# Patient Record
Sex: FEMALE | Race: WHITE
[De-identification: names, ages, dates, MRNs, and addresses within clinical notes are randomized per-mention and may not be internally consistent; named-entity substitution may affect disease eponyms.]

---

## 2019-01-11 ENCOUNTER — HOSPITAL ENCOUNTER (EMERGENCY)
Dept: HOSPITAL 74 - FER | Age: 34
Discharge: HOME | End: 2019-01-11
Payer: COMMERCIAL

## 2019-01-11 VITALS — SYSTOLIC BLOOD PRESSURE: 135 MMHG | HEART RATE: 77 BPM | DIASTOLIC BLOOD PRESSURE: 80 MMHG | TEMPERATURE: 98.6 F

## 2019-01-11 VITALS — BODY MASS INDEX: 22.3 KG/M2

## 2019-01-11 DIAGNOSIS — L50.9: Primary | ICD-10-CM

## 2019-01-11 NOTE — PDOC
History of Present Illness





- General


Chief Complaint: Hives


Stated Complaint: HIVES


Time Seen by Provider: 01/11/19 00:42


History Source: Patient


Exam Limitations: No Limitations





- History of Present Illness


Initial Comments: 





01/11/19 00:45


This is a 33-year-old female who comes in complaining of hives. Patient said she

's had them times one day. Patient doesn't know what she is ALLERGIC to but 

denies any specific new lotion screams ointments or foods. Patient denies any 

shortness of breath or difficulty swallowing.





Allergies: None


Past Medical History: none


Social history: Lives with family. No smoking. No alcohol. No illicit drugs.


Surgical history: None








General:  No fevers or chills, no weakness, no weight loss 


HEENT: No change in vision.  No sore throat,. No ear pain


CardioVascular: no chest discomfort. No shortness of breath


Respiratory:No cough, or wheezing. 


Gastrointestinal:  no nausea, vomiting, diarrhea or constipation,  No rectal 

bleeding


Genitourinary:  No dysuria, hematuria, or frequency


Musculoskeletal:  No joint or muscle pain or swelling


Neurologic: No headache, vertigo, dizziness or loss of consciousness


Psychiatric: nor depression 


Skin:+ Hives no easy bruising


Endocrine: no increased thirst or abnormal weight change


Allergic: no skin or latex allergy


All other systems reviewed and normal








GENERAL: The patient is awake, alert, and fully 


oriented, in no acute distress.


HEAD: Normal with no signs of trauma.


EYES: Pupils equal, round and reactive to light, extraocular movements intact, 

sclera anicteric, conjunctiva clear.


EXTREMITIES:atraumatic, Normal range of motion, no edema.


NEUROLOGICAL: Normal speech, normal gait.


PSYCH: Normal mood, normal affect.


SKIN: Warm, Dry, normal turgor, + high as 2 through arms and legs as well as 

neck. Face is spared





Past History





- Past Medical History


Allergies/Adverse Reactions: 


 Allergies











Allergy/AdvReac Type Severity Reaction Status Date / Time


 


No Known Drug Allergies Allergy   Verified 10/08/12 18:29











Home Medications: 


Ambulatory Orders





Norelgestromin/Ethin.estradiol [Xulane Patch] 1 each TD DAILY 01/11/19 


Spironolactone 100 mg PO DAILY 01/11/19 








Cardiac Disorders: Yes (MURMUR)


COPD: No


Other medical history: ACNE





- Suicide/Smoking/Psychosocial Hx


Smoking Status: No


Smoking History: Never smoked


Number of Cigarettes Smoked Daily: 0





*Physical Exam





- Vital Signs


 Last Vital Signs











Temp Pulse Resp BP Pulse Ox


 


 98.6 F   77   16   135/80   100 


 


 01/11/19 00:38  01/11/19 00:38  01/11/19 00:38  01/11/19 00:38  01/11/19 00:38














Moderate Sedation





- Procedure Monitoring


Vital Signs: 


Procedure Monitoring Vital Signs











Temperature  98.6 F   01/11/19 00:38


 


Pulse Rate  77   01/11/19 00:38


 


Respiratory Rate  16   01/11/19 00:38


 


Blood Pressure  135/80   01/11/19 00:38


 


O2 Sat by Pulse Oximetry (%)  100   01/11/19 00:38











*DC/Admit/Observation/Transfer


Diagnosis at time of Disposition: 


 Hives








- Discharge Dispostion


Disposition: HOME


Condition at time of disposition: Stable


Decision to Admit order: No





- Referrals


Referrals: 


Negar Steve MD [Primary Care Provider] - 





- Patient Instructions


Additional Instructions: 


Take the Medrol Dosepak as prescribed by the pack.





If needed U can also take Benadryl in addition to the Medrol Dosepak U can take 

one tablet every 4-6 hours if needed





Return to the emergency department immediately with ANY new, persistent or 

worsening symptoms.





Continue any medications as previously prescribed by your physician.





You should follow up with your primary doctor as soon as possible regarding 

today's emergency department visit.


.


Please make sure your doctor reviews the results of your emergency evaluation.





Thank you for coming to the   Emergency Department today for your care. It was 

a pleasure to see you today. Please note that your evaluation is INCOMPLETE 

until you  follow-up with your doctor. 





- Post Discharge Activity

## 2023-02-24 ENCOUNTER — NON-APPOINTMENT (OUTPATIENT)
Age: 38
End: 2023-02-24

## 2023-03-06 PROBLEM — Z00.00 ENCOUNTER FOR PREVENTIVE HEALTH EXAMINATION: Status: ACTIVE | Noted: 2023-03-06

## 2023-05-09 ENCOUNTER — APPOINTMENT (OUTPATIENT)
Dept: PLASTIC SURGERY | Facility: CLINIC | Age: 38
End: 2023-05-09
Payer: COMMERCIAL

## 2023-05-09 VITALS
BODY MASS INDEX: 22.36 KG/M2 | TEMPERATURE: 98 F | DIASTOLIC BLOOD PRESSURE: 79 MMHG | HEIGHT: 64 IN | OXYGEN SATURATION: 100 % | WEIGHT: 131 LBS | SYSTOLIC BLOOD PRESSURE: 116 MMHG | HEART RATE: 72 BPM

## 2023-05-09 PROCEDURE — 99203 OFFICE O/P NEW LOW 30 MIN: CPT

## 2023-05-09 NOTE — PHYSICAL EXAM
[Normocephalic] : normocephalic [Atraumatic] : atraumatic [EOMI] : extra ocular movement intact [Sclera nonicteric] : sclera nonicteric [Supple] : supple [No Supraclavicular Adenopathy] : no supraclavicular adenopathy [No Cervical Adenopathy] : no cervical adenopathy [No Thyromegaly] : no thyromegaly [Clear to Auscultation Bilat] : clear to auscultation bilaterally [Normal Sinus Rhythm] : normal sinus rhythm [Normal S1, S2] : normal S1 and S2 [No Gallops] : no gallops [No Rubs] : no pericardial rub [Examined in the supine and seated position] : examined in the supine and seated position [No dominant masses] : no dominant masses in right breast  [No dominant masses] : no dominant masses left breast [No Nipple Retraction] : no left nipple retraction [No Nipple Discharge] : no left nipple discharge [No Axillary Lymphadenopathy] : no left axillary lymphadenopathy [No Edema] : no edema [No Rashes] : no rashes [No Ulceration] : no ulceration [de-identified] : 9:30 (N2cm) subcutaneous palpable mass 0.2 x 0.2 cm c/w probable epidermal inclusion cyst, no evidence of infection. 9:00 (N1cm) 0.2 x 0.2 cm subcutaneous thickening at site of prior nipple ring. [de-identified] : 3:00 (N1cm) 0.2 x 0.2 cm subcutaneous thickening at site of prior nipple ring.

## 2023-05-09 NOTE — HISTORY OF PRESENT ILLNESS
[FreeTextEntry1] : Patient is a 37 year old female here today for the evaluation of right breast pain.\par There is no family history of breast or ovarian cancer.\par She has a history of a right breast abscess in 2018\par 12/6/2021 s/p right 3:00 RA core biopsy with wing clip, performed by a breast surgeon, Dr. Yovana Priest. Pathology benign breast tissue with florid sclerosing adenosis and dense nodular stromal fibrosis.\par 7/18/2022 Bilateral ultrasound: Ultrasound examination of the bilateral breasts was performed  utilizing real-time scanning. In the left breast, there are simple cysts measuring up to 0.7 cm.  In the retroareolar region of the right breast at the 9  o'clock axis, there is a previously biopsied hypoechoic solid mass measuring 0.4 cm, unchanged from the prior ultrasound examination dated 12/6/2021. The  stability of the findings is consistent with a benign etiology.  Ultrasound examination of the bilateral axillae reveals no significant sonographic abnormalities. BI-RADS 2\par She presented to Urgent care in 2/2023 for the evaluation of right breast pain.\par She underwent a right breast ultrasound\par 2/2/2023 Right ultrasound ([facility]): A 0.4 x 0.3 x 0.3 cm previously biopsied and benign circumscribed hypoechoic mass in the 9:00 retroareolar right breast is without significant change from 7/18/2022 and measured 0.6 x 0.4 x 0.6 cm on prebiopsy images of 12/6/2021. No axillary lymphadenopathy. Recommend further management of breast of breast pain as clinically warranted. A bilateral diagnostic mammogram could be considered. BI-RADS 2.\par She came alone\par History of bilateral nipple piercing with right nipple infection in her 20's\par Since 2018 she has had 3 episodes of right breast 9:00 infections with warmth and redness lateral breast resolved after spontaneous drainage and antibiotics, incision and drainage not performed.\par At the end of 1/2023 she noted another inflamed lump closer to the nipple right lateral breast. She showed me a picture of right lateral nipple. She went to the ER and was given antibiotics and it slightly drained pus and then resolved.

## 2023-05-09 NOTE — REVIEW OF SYSTEMS
[Negative] : Heme/Lymph [FreeTextEntry5] : Chest pain, heart murmur, problem with the heart rhythm  [FreeTextEntry7] : Transition to a carnivore diet

## 2023-05-09 NOTE — ASSESSMENT
[FreeTextEntry1] : Right epidermal inclusion cyst\par Clinical breast exam benign \par \par 1. Annual bilateral mammogram/bilateral breast ultrasound at age 42\par 2. Follow up office visit as needed\par 3. Advised monthly self breast examinations and advised her to contact me if she has any concerns. \par 4. She inquired about genetic testing: [facility] genetic testing booklet discussed with her, blood drawn today\par \par Patient seen and examined with my PA Gardenia Conway present

## 2023-05-09 NOTE — CONSULT LETTER
[Dear  ___] : Dear  [unfilled], [Courtesy Letter:] : I had the pleasure of seeing your patient, [unfilled], in my office today. [Please see my note below.] : Please see my note below. [Sincerely,] : Sincerely, [DrEhsan  ___] : Dr. LUNA [DrEhsan ___] : Dr. LUNA [FreeTextEntry3] : Susan M. Palleschi, MD, FACS\par Division of Breast Surgery\par Director, Breast Surgery\par [facility]\par [address] [place] [address]\par [address]\[place] [address]\par (Phone) [phone]\par (Fax) [phone]

## 2023-06-02 ENCOUNTER — NON-APPOINTMENT (OUTPATIENT)
Age: 38
End: 2023-06-02

## 2023-07-31 ENCOUNTER — HOSPITAL ENCOUNTER (EMERGENCY)
Dept: HOSPITAL 74 - FER | Age: 38
Discharge: HOME | End: 2023-07-31
Payer: COMMERCIAL

## 2023-07-31 VITALS
SYSTOLIC BLOOD PRESSURE: 141 MMHG | HEART RATE: 73 BPM | TEMPERATURE: 98.5 F | DIASTOLIC BLOOD PRESSURE: 84 MMHG | RESPIRATION RATE: 18 BRPM

## 2023-07-31 VITALS — BODY MASS INDEX: 22.3 KG/M2

## 2023-07-31 DIAGNOSIS — M54.6: Primary | ICD-10-CM

## 2023-07-31 LAB
ALBUMIN SERPL-MCNC: 4.3 G/DL (ref 3.4–5)
ALP SERPL-CCNC: 51 U/L (ref 45–117)
ALT SERPL-CCNC: 18.6 U/L (ref 7–52)
ANION GAP SERPL CALC-SCNC: 10 MMOL/L (ref 8–16)
AST SERPL-CCNC: 15.3 U/L (ref 15–37)
BILIRUB SERPL-MCNC: 0.3 MG/DL (ref 0.2–1)
BUN SERPL-MCNC: 10.9 MG/DL (ref 7–18)
CALCIUM SERPL-MCNC: 9.1 MG/DL (ref 8.5–10.1)
CHLORIDE SERPL-SCNC: 104 MMOL/L (ref 98–107)
CO2 SERPL-SCNC: 24 MMOL/L (ref 21–32)
CREAT SERPL-MCNC: 0.7 MG/DL (ref 0.6–1.3)
DEPRECATED RDW RBC AUTO: 14.5 % (ref 11.6–15.6)
GLUCOSE SERPL-MCNC: 92 MG/DL (ref 74–106)
HCT VFR BLD CALC: 38 % (ref 32.4–45.2)
HGB BLD-MCNC: 12.8 G/DL (ref 10.7–15.3)
MCH RBC QN AUTO: 28.7 PG (ref 25.7–33.7)
MCHC RBC AUTO-ENTMCNC: 33.8 G/DL (ref 32–36)
MCV RBC: 85.1 FL (ref 80–96)
PLATELET # BLD AUTO: 152.9 10^3/UL (ref 134–434)
PLATELET BLD QL SMEAR: ADEQUATE
PMV BLD: 9 FL (ref 7.5–11.1)
POTASSIUM SERPLBLD-SCNC: 3.6 MMOL/L (ref 3.5–5.1)
PROT SERPL-MCNC: 6.6 G/DL (ref 6.4–8.2)
RBC # BLD AUTO: 4.47 10^6/UL (ref 3.6–5.2)
SODIUM SERPL-SCNC: 138 MMOL/L (ref 136–145)
WBC # BLD AUTO: 9.7 10^3/UL (ref 4–10.8)

## 2023-07-31 PROCEDURE — 3E0337Z INTRODUCTION OF ELECTROLYTIC AND WATER BALANCE SUBSTANCE INTO PERIPHERAL VEIN, PERCUTANEOUS APPROACH: ICD-10-PCS

## 2024-02-09 ENCOUNTER — APPOINTMENT (OUTPATIENT)
Dept: PLASTIC SURGERY | Facility: CLINIC | Age: 39
End: 2024-02-09
Payer: COMMERCIAL

## 2024-02-09 VITALS
BODY MASS INDEX: 22.36 KG/M2 | OXYGEN SATURATION: 99 % | SYSTOLIC BLOOD PRESSURE: 120 MMHG | WEIGHT: 131 LBS | HEART RATE: 83 BPM | DIASTOLIC BLOOD PRESSURE: 75 MMHG | HEIGHT: 64 IN | TEMPERATURE: 97.6 F

## 2024-02-09 PROCEDURE — 99213 OFFICE O/P EST LOW 20 MIN: CPT

## 2024-02-09 RX ORDER — CEFADROXIL 500 MG/1
500 CAPSULE ORAL TWICE DAILY
Qty: 20 | Refills: 1 | Status: ACTIVE | COMMUNITY
Start: 2024-02-09 | End: 1900-01-01

## 2024-02-09 NOTE — HISTORY OF PRESENT ILLNESS
[FreeTextEntry1] : The patient is a 38 year old female here today with concern regarding a recurrent infection of her right nipple.  There is no family history of breast or ovarian cancer. She has a history of a right breast abscess in 2018 12/6/2021 s/p right 3:00 RA core biopsy with wing clip, performed by a breast surgeon, Dr. Yovana Priest. Pathology benign breast tissue with florid sclerosing adenosis and dense nodular stromal fibrosis. 7/18/2022 Bilateral ultrasound: Ultrasound examination of the bilateral breasts was performed utilizing real-time scanning. In the left breast, there are simple cysts measuring up to 0.7 cm. In the retroareolar region of the right breast at the 9 o'clock axis, there is a previously biopsied hypoechoic solid mass measuring 0.4 cm, unchanged from the prior ultrasound examination dated 12/6/2021. The stability of the findings is consistent with a benign etiology. Ultrasound examination of the bilateral axillae reveals no significant sonographic abnormalities. BI-RADS 2 She presented to Urgent care in 2/2023 for the evaluation of right breast pain. She underwent a right breast ultrasound 2/2/2023 Right ultrasound ([facility]): A 0.4 x 0.3 x 0.3 cm previously biopsied and benign circumscribed hypoechoic mass in the 9:00 retroareolar right breast is without significant change from 7/18/2022 and measured 0.6 x 0.4 x 0.6 cm on prebiopsy images of 12/6/2021. No axillary lymphadenopathy. Recommend further management of breast of breast pain as clinically warranted. A bilateral diagnostic mammogram could be considered. BI-RADS 2. History of bilateral nipple piercing with right nipple infection in her 20's Since 2018 she has had 3 episodes of right breast 9:00 infections with warmth and redness lateral breast resolved after spontaneous drainage and antibiotics, incision and drainage not performed. At the end of 1/2023 she noted another inflamed lump closer to the nipple right lateral breast. She showed me a picture of right lateral nipple. She went to the ER and was given antibiotics and it slightly drained pus and then resolved.  5/9/2023- Office visit with Dr. Palleschi.  5/9/2023- [facility] Comprehensive Genetic Testing: tana Banuelos noted a pressure discomfort of the right breast  on 2/5/2024.  She then noted redness and warmth.  She denies any pain.  She denies any spontaneous drainage or fever.  She has not been placed on antibiotics.  She came alone.

## 2024-02-09 NOTE — CONSULT LETTER
[Dear  ___] : Dear  [unfilled], [Courtesy Letter:] : I had the pleasure of seeing your patient, [unfilled], in my office today. [Please see my note below.] : Please see my note below. [Sincerely,] : Sincerely, [DrEhsan  ___] : Dr. LUNA [DrEhsan ___] : Dr. LUNA [FreeTextEntry3] : Susan M. Palleschi, MD, FACS\par  Division of Breast Surgery\par  Director, Breast Surgery\par  [facility]\par  [address] [place] [address]\par  [address]\[place] [address]\par  (Phone) [phone]\par  (Fax) [phone]

## 2024-02-09 NOTE — REVIEW OF SYSTEMS
[Negative] : Heme/Lymph [FreeTextEntry5] : "Problems with heart rhythm" [de-identified] : "Problems with skin/skin cancer"  [FreeTextEntry1] : Colon cancer

## 2024-02-09 NOTE — PHYSICAL EXAM
[Normocephalic] : normocephalic [Atraumatic] : atraumatic [EOMI] : extra ocular movement intact [Sclera nonicteric] : sclera nonicteric [Supple] : supple [No Supraclavicular Adenopathy] : no supraclavicular adenopathy [No Cervical Adenopathy] : no cervical adenopathy [No Thyromegaly] : no thyromegaly [Clear to Auscultation Bilat] : clear to auscultation bilaterally [Normal Sinus Rhythm] : normal sinus rhythm [Normal S1, S2] : normal S1 and S2 [No Gallops] : no gallops [No Rubs] : no pericardial rub [Examined in the supine and seated position] : examined in the supine and seated position [No dominant masses] : no dominant masses in right breast  [No dominant masses] : no dominant masses left breast [No Nipple Retraction] : no left nipple retraction [No Nipple Discharge] : no left nipple discharge [No Axillary Lymphadenopathy] : no left axillary lymphadenopathy [No Edema] : no edema [No Rashes] : no rashes [No Ulceration] : no ulceration [de-identified] : Right 9:00 breast erythema with associated underlying palpable swelling measuring 1.5 x 1.5 cm, located 1.5 cm from the nipple.  The findings are consistent with infection and abscess.  Applying pressure to the nipple area resulted in expressible pus from the right 2:00 duct with resulting decreased size of the palpable swelling.

## 2024-02-09 NOTE — ASSESSMENT
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 She inquired about elective surgical excision of the epidermal inclusion cyst to decrease her future risk of recurrent infections.  1.  I will send in a prescription for Duricef 500 mg p.o. twice daily x 10 days. 2. Follow up office visit 2 weeks for re-examination. 3.  I discussed with her the potential option of undergoing elective surgical excision of any residual epidermal inclusion cyst and cleanout of the subareolar ducts to aim to lower her potential risk for recurrent infection.  I will discuss this with her further at the next office visit.  I discussed with her that this surgical procedure may preclude her from breast-feeding in the future.  She states that she does not intend to have children. 4. Annual bilateral mammogram/bilateral breast ultrasound at age 42 5. Advised monthly self breast examinations and advised her to contact me if she has any concerns.

## 2024-02-13 ENCOUNTER — NON-APPOINTMENT (OUTPATIENT)
Age: 39
End: 2024-02-13

## 2024-02-21 ENCOUNTER — APPOINTMENT (OUTPATIENT)
Dept: PLASTIC SURGERY | Facility: CLINIC | Age: 39
End: 2024-02-21
Payer: COMMERCIAL

## 2024-02-21 VITALS
OXYGEN SATURATION: 99 % | HEART RATE: 83 BPM | WEIGHT: 131 LBS | TEMPERATURE: 98 F | BODY MASS INDEX: 22.36 KG/M2 | SYSTOLIC BLOOD PRESSURE: 111 MMHG | DIASTOLIC BLOOD PRESSURE: 23 MMHG | HEIGHT: 64 IN

## 2024-02-21 DIAGNOSIS — N60.81 OTHER BENIGN MAMMARY DYSPLASIAS OF RIGHT BREAST: ICD-10-CM

## 2024-02-21 PROCEDURE — 99214 OFFICE O/P EST MOD 30 MIN: CPT

## 2024-02-21 NOTE — PHYSICAL EXAM
[de-identified] : Right 9:00 palpable mass (N1.5 cm) 3 x 3 mm at edge of areola border, consistent with inclusion cyst. No infection

## 2024-02-21 NOTE — REVIEW OF SYSTEMS
[Negative] : Heme/Lymph [FreeTextEntry5] : "Heart Murmur- minor, problems with heart rhythm- sometimes races randomly (COVID vaccine?)"

## 2024-02-21 NOTE — HISTORY OF PRESENT ILLNESS
[FreeTextEntry1] : The patient is a 38 year old female here today for a follow up for a right breast infection.  There is no family history of breast or ovarian cancer.  Her paternal grandmother had colon cancer diagnosed in her late 30s or early 40s. She has a history of a right breast abscess in 2018 12/6/2021 s/p right 3:00 RA core biopsy with wing clip, performed by a breast surgeon, Dr. Yovana Priest. Pathology benign breast tissue with florid sclerosing adenosis and dense nodular stromal fibrosis. 7/18/2022 Bilateral ultrasound: Ultrasound examination of the bilateral breasts was performed utilizing real-time scanning. In the left breast, there are simple cysts measuring up to 0.7 cm. In the retroareolar region of the right breast at the 9 o'clock axis, there is a previously biopsied hypoechoic solid mass measuring 0.4 cm, unchanged from the prior ultrasound examination dated 12/6/2021. The stability of the findings is consistent with a benign etiology. Ultrasound examination of the bilateral axillae reveals no significant sonographic abnormalities. BI-RADS 2 She presented to Urgent care in 2/2023 for the evaluation of right breast pain. She underwent a right breast ultrasound 2/2/2023 Right ultrasound ([facility]): A 0.4 x 0.3 x 0.3 cm previously biopsied and benign circumscribed hypoechoic mass in the 9:00 retroareolar right breast is without significant change from 7/18/2022 and measured 0.6 x 0.4 x 0.6 cm on prebiopsy images of 12/6/2021. No axillary lymphadenopathy. Recommend further management of breast of breast pain as clinically warranted. A bilateral diagnostic mammogram could be considered. BI-RADS 2. History of bilateral nipple piercing with right nipple infection in her 20's Since 2018 she has had 3 episodes of right breast 9:00 infections with warmth and redness lateral breast resolved after spontaneous drainage and antibiotics, incision and drainage not performed. At the end of 1/2023 she noted another inflamed lump closer to the nipple right lateral breast. She showed me a picture of right lateral nipple. She went to the ER and was given antibiotics and it slightly drained pus and then resolved.  5/9/2023- [facility] Genetic Testing: negative for breast.  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 She was seen in the office 2/9/2024 for a right breast abscess. She completed a 7 day course of Cefadroxil.  She states that the infection has resolved.  She does note a small palpable mass at the site of the prior infection.  She came alone.

## 2024-02-21 NOTE — CONSULT LETTER
[FreeTextEntry3] : Susan M. Palleschi, MD, FACS\par  Division of Breast Surgery\par  Director, Breast Surgery\par  [facility]\par  [address] [place] [address]\par  [address]\[place] [address]\par  (Phone) [phone]\par  (Fax) [phone]

## 2024-02-21 NOTE — ASSESSMENT
[FreeTextEntry1] : History of right epidermal inclusion cyst with recurrent infection and abscess. Infection resolved with course of antibiotics. She is interested in elective surgical excision of the epidermal inclusion cyst to decrease her future risk of recurrent infections.  1.  Annual bilateral mammogram/bilateral breast ultrasound at age 42 2. Follow up office visit postop 3.  I discussed with her the potential option of undergoing elective surgical excision of the residual epidermal inclusion cyst and cleanout of the subareolar ducts to aim to lower her potential risk for recurrent infection.  The procedure was discussed with her in detail, including but not limited to bleeding, infection, scar, change in breast appearance, risk of infection.  I discussed with her that this surgical procedure may preclude her from breast-feeding in the future.  She states that she does not intend to have children.  The breast biopsy booklet and postoperative instructions were reviewed and provided. 4. Advised monthly self breast examinations and advised her to contact me if she has any concerns.  5.  I will refer her to the [facility] cancer genetic program to further discuss her genetic mutation which puts her at increased risk for colon polyps.  She has not yet undergone a baseline colonoscopy.

## 2024-02-28 ENCOUNTER — NON-APPOINTMENT (OUTPATIENT)
Age: 39
End: 2024-02-28

## 2024-03-22 ENCOUNTER — OUTPATIENT (OUTPATIENT)
Dept: OUTPATIENT SERVICES | Facility: HOSPITAL | Age: 39
LOS: 1 days | Discharge: HOME | End: 2024-03-22

## 2024-03-22 DIAGNOSIS — Z15.09 GENETIC SUSCEPTIBILITY TO OTHER MALIGNANT NEOPLASM: ICD-10-CM

## 2024-03-25 ENCOUNTER — OUTPATIENT (OUTPATIENT)
Dept: OUTPATIENT SERVICES | Facility: HOSPITAL | Age: 39
LOS: 1 days | End: 2024-03-25
Payer: COMMERCIAL

## 2024-03-25 VITALS
SYSTOLIC BLOOD PRESSURE: 105 MMHG | DIASTOLIC BLOOD PRESSURE: 70 MMHG | RESPIRATION RATE: 16 BRPM | HEIGHT: 64 IN | HEART RATE: 85 BPM | OXYGEN SATURATION: 99 % | TEMPERATURE: 98 F | WEIGHT: 140.65 LBS

## 2024-03-25 DIAGNOSIS — D24.1 BENIGN NEOPLASM OF RIGHT BREAST: ICD-10-CM

## 2024-03-25 DIAGNOSIS — Z98.890 OTHER SPECIFIED POSTPROCEDURAL STATES: Chronic | ICD-10-CM

## 2024-03-25 DIAGNOSIS — Z01.818 ENCOUNTER FOR OTHER PREPROCEDURAL EXAMINATION: ICD-10-CM

## 2024-03-25 LAB
HCT VFR BLD CALC: 38.8 % — SIGNIFICANT CHANGE UP (ref 34.5–45)
HGB BLD-MCNC: 12.8 G/DL — SIGNIFICANT CHANGE UP (ref 11.5–15.5)
MCHC RBC-ENTMCNC: 27.9 PG — SIGNIFICANT CHANGE UP (ref 27–34)
MCHC RBC-ENTMCNC: 33 GM/DL — SIGNIFICANT CHANGE UP (ref 32–36)
MCV RBC AUTO: 84.5 FL — SIGNIFICANT CHANGE UP (ref 80–100)
NRBC # BLD: 0 /100 WBCS — SIGNIFICANT CHANGE UP (ref 0–0)
PLATELET # BLD AUTO: 208 K/UL — SIGNIFICANT CHANGE UP (ref 150–400)
RBC # BLD: 4.59 M/UL — SIGNIFICANT CHANGE UP (ref 3.8–5.2)
RBC # FLD: 12.6 % — SIGNIFICANT CHANGE UP (ref 10.3–14.5)
WBC # BLD: 8.36 K/UL — SIGNIFICANT CHANGE UP (ref 3.8–10.5)
WBC # FLD AUTO: 8.36 K/UL — SIGNIFICANT CHANGE UP (ref 3.8–10.5)

## 2024-03-25 PROCEDURE — 36415 COLL VENOUS BLD VENIPUNCTURE: CPT

## 2024-03-25 PROCEDURE — 85027 COMPLETE CBC AUTOMATED: CPT

## 2024-03-25 PROCEDURE — G0463: CPT

## 2024-03-25 NOTE — H&P PST ADULT - NEGATIVE BREAST SYMPTOMS
see HPI/no breast tenderness L/no breast tenderness R/no breast lump L/no breast lump R/no nipple discharge L/no nipple discharge R

## 2024-03-25 NOTE — H&P PST ADULT - HISTORY OF PRESENT ILLNESS
This is a 39 y/o female who presents to PST with pre-operative diagnosis of benign neoplasm of right breast.  H/o breast abbesses  that required I&D and antibiotics.  Mass noted on imaging in right breast.  Likely epidermal inclusion cyst. Mass to be removed to prevent recurrent infections.  Scheduled for 4/18/24.  Today denies any breast pain, nipple discharge, fever/chills.

## 2024-03-25 NOTE — H&P PST ADULT - ATTENDING COMMENTS
The patient is a 38 year old female who has a history of recurrent right breast infections, related to an epidermal inclusion cyst. She presents to undergo excision of the right breast inclusion cyst.

## 2024-03-27 ENCOUNTER — APPOINTMENT (OUTPATIENT)
Dept: HEMATOLOGY ONCOLOGY | Facility: CLINIC | Age: 39
End: 2024-03-27

## 2024-03-27 NOTE — DISCUSSION/SUMMARY
[FreeTextEntry1] : The visit was provided via telehealth using real-time 2-way audio visual technology. The patient, Lakisha Beauchamp, was located at work, in [facility], at the time of the visit. The Genetic Counselor, Luci Matthews, was located in [place], [place]. The patient and the Genetic Counselor both participated in the telehealth encounter. Consent for telehealth services was given on 3/27/2024 by the patient, Lakisha Beauchamp.    REASON FOR CONSULT  Lakisha Beauchamp is a 38-year-old female who was referred by Dr. Palleschi for cancer genetic counseling and discussion of her genetic test results.    RELEVANT MEDICAL HISTORY    FAMILY HISTORY:  Ms. Beauchamp is a healthy individual who has never had cancer. She has a family history of early onset colorectal cancer in her paternal grandmother, see below.    Of note, Ms. Beauchamp had genetic testing with [employer]'s Common Hereditary Cancers panel (47 genes) which was ordered by Dr. Palleschi's office. Ms. 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 This testing was reported on 2023   OTHER MEDICAL AND SURGICAL HISTORY:  History of right breast epidermal inclusion cyst with recurrent infection and abscess since 2018 + right nipple infection in her 20s. History of right breast abscess in . Recent right breast abscess in 2024 treated with cefadroxil. Sebaceous cyst of right breast. Plans to undergo elective surgical excision of right breast epidermal inclusion cyst and cleanout of the subareolar ducts in 2024. No previous surgeries.    PAST OB/GYN HISTORY:  Obstetrical History:   Age at Menarche: 14 Premenopausal Age at First Live Birth: N/A Oral Contraceptive Use: Yes, (less than 1 year)  Hormone Replacement Therapy: No   CANCER SCREENING HISTORY:    Breast: Annual b/l mammo and US recommended at age 42. Monthly breast exams advised. Last US of right breast on 2023 - benign circumscribed hypoechoic mass in right breast which was previously biopsied and stable. B/l diagnostic mammogram could be considered - BIRADS-2. Bilateral US 2022 - simple cysts seen in left breast, and hypoechoic solid mass in right breast which was previously biopsied and remains unchanged from US on 2021. Stable findings consistent with benign etiology.  Previous history of right breast core bx on 2021 - benign breast tissue with florid sclerosing adenosis and dense nodular stromal fibrosis. GYN: Last pelvic exam on 3/27/2024 reported right ovarian cyst, pending follow up. Follows with Dr. Puneet Mendez at [facility] in [place]. Follows up annually.   Colon: No colonoscopy.  Skin: Last FBSE in  reported normal. Follows up with dermatology as needed.      SOCIAL HISTORY:  -	Single  -	Tobacco-product use: No   FAMILY HISTORY:  Maternal ancestry was reported as [place] and paternal ancestry was reported as [place]. A detailed family history of cancer was ascertained. Relevant diagnoses are detailed below and in the scanned pedigree.     To Ms. Beauchamp''s knowledge, no one in the family has had germline testing for cancer susceptibility.      RESULTS INTERPRETATION AND ASSESSMENT:    We informed Ms. 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 We discussed that individuals with a single mutation in NTHL1, also known as NTHL1 carriers, are not believed to have any increased risk for cancer over the general population. Individuals with TWO bi-allelic mutations in the NTHL1 gene are known to have NTHL1-associated polyposis, an autosomal recessive condition characterized by the development of multiple adenomatous colorectal polyps, and increased risk for colorectal cancer. Ms. Beauchamp DOES NOT have NTHL1-associated polyposis as a single pathogenic variant in the NTHL1 gene is NOT sufficient to cause NTHL1-associated polyposis. Therefore, there are no medical management changes for NTHL1 carriers at this time.     We also discussed that, while the cause of the early onset colon cancer in her paternal grandmother remains unknown, this result, while reassuring, does not entirely rule out a hereditary cancer risk in the patient. It is possible, although unlikely, the patient has a mutation in one of the genes tested that is not detectable by this analysis, or has a mutation in a different gene, either known or unknown. It is also possible there is a hereditary cancer predisposition in the family, but the patient did not inherit it.    IMPLICATIONS FOR THE PATIENT:  Given Ms. Beauchamp's current reported family history of cancer, and her NTHL1 heterozygous carrier status, the following screening guidelines and risk-reducing recommendations were discussed:    BREAST:  - Ms. Beauchamp should continue with age-appropriate breast follow up as determined by her breast specialist.  COLON:  - Heterozygous carriers of a pathogenic NTLH1 variants are not believed to have any increased risk for colon cancer or polyposis over the general population. NTHL1 carrier status should not lead to a recommendation for increased or more frequent colonoscopies based on genetic status alone at this time.  - In the absence of a first degree relative with colon cancer, and/or any other indications or symptoms, we recommend age-appropriate colonoscopies for Ms. Beauchamp as recommended for the general population.   OTHER:  - In the absence of other indications, Ms. Beauchamp should practice age-appropriate cancer screening of other organ systems as recommended for the general population.    IMPLICATIONS FOR FAMILY MEMBERS:  It was discussed that the NTLH1 variant is inherited in an autosomal dominant pattern. We recommend the patient's first-degree relatives, specifically her siblings and parents, consider genetic counseling and genetic testing as there is a 50% chance they also have the same mutation. Testing her parents would determine which side of the family the NTH1 is being inherited from and thus identify whether maternal or paternal aunts/uncles/cousins are at risk of carrying the NTHL mutation.  Please note, individuals with a single pathogenic mutation in the NTLH1 gene DO NOT have NTHL1-associated polyposis however this knowledge on carrier status may be important in determining reproductive risk for NTLH1-associated polyposis. If both the individual and their reproductive partner are carriers of a pathogenic NTHL1 mutation, there is a 25% chance for each pregnancy to have a child affected with NTHL1-associated polyposis. Ms. Beauchamp was made aware that if any at-risk relatives wanted to pursue genetic testing any time in the future, we would be happy to see them and coordinate testing. If they are not local, they can locate a genetic counselor using the National Society of Genetic Counselors, Find a Genetic Counselor Tool (www.nsgc.org/findageneticcounselor).  In addition, we discussed that Ms. Beauchamp's paternal grandmother could consider genetic counseling with the option of genetic testing given her early onset colon cancer.  Ms. Beauchamp was also informed that her father and paternal aunts would be advised to undergo colonoscopies every 5 years (or more frequently depending on the findings) as per the U.S. Multi-Society Task Force on Colorectal Cancer guidelines.    REPRODUCTIVE IMPLICATIONS AND OPTIONS  The risk of passing on this mutation to a future generation is 50%. Since the genetic mutation is known, pre-implantation genetic diagnosis (PGD) is possible for individuals of reproductive age. Ms. Beauchamp is currently single and not planning any children however if this changes, we discussed the option of NTHL carrier testing for her reproductive partner.     RESOURCES & SUPPORT GROUPS    In addition, the oncology social workers at [facility] are available to assist with more referrals, if necessary.    PLAN:  1. See above note for recommended management.  2. We encouraged sharing these results with family members. They have a risk to have inherited the same mutation. Other family may benefit from genetic testing and should contact a certified genetic counselor specializing in cancer. Due to HIPAA and [place] laws, Genetics is unable to directly contact other family at risk, but we are available should family members wish to reach out to us.  3. Family support resources and referrals were provided.  4. Patient informed consult note(s) will be available through their [facility] patient portal.  5. Genetic knowledge changes rapidly. We encouraged re-contacting Cancer Genetics every 2-3 years for any changes in screening recommendations or sooner if there are significant changes in personal or family history.   For any additional questions please call Cancer Genetics at [phone].      Luci Matthews, MSc, [facility]  Genetic Counselor, Cancer Genetics      CC:   Dr. Palleschi

## 2024-04-17 ENCOUNTER — TRANSCRIPTION ENCOUNTER (OUTPATIENT)
Age: 39
End: 2024-04-17

## 2024-04-18 ENCOUNTER — RESULT REVIEW (OUTPATIENT)
Age: 39
End: 2024-04-18

## 2024-04-18 ENCOUNTER — OUTPATIENT (OUTPATIENT)
Dept: OUTPATIENT SERVICES | Facility: HOSPITAL | Age: 39
LOS: 1 days | End: 2024-04-18
Payer: COMMERCIAL

## 2024-04-18 ENCOUNTER — TRANSCRIPTION ENCOUNTER (OUTPATIENT)
Age: 39
End: 2024-04-18

## 2024-04-18 ENCOUNTER — APPOINTMENT (OUTPATIENT)
Dept: PLASTIC SURGERY | Facility: HOSPITAL | Age: 39
End: 2024-04-18
Payer: COMMERCIAL

## 2024-04-18 VITALS
DIASTOLIC BLOOD PRESSURE: 81 MMHG | OXYGEN SATURATION: 99 % | TEMPERATURE: 97 F | HEART RATE: 80 BPM | RESPIRATION RATE: 16 BRPM | SYSTOLIC BLOOD PRESSURE: 122 MMHG

## 2024-04-18 VITALS
SYSTOLIC BLOOD PRESSURE: 115 MMHG | TEMPERATURE: 97 F | OXYGEN SATURATION: 98 % | HEART RATE: 86 BPM | DIASTOLIC BLOOD PRESSURE: 76 MMHG | RESPIRATION RATE: 18 BRPM | HEIGHT: 64 IN | WEIGHT: 140.65 LBS

## 2024-04-18 DIAGNOSIS — Z98.890 OTHER SPECIFIED POSTPROCEDURAL STATES: Chronic | ICD-10-CM

## 2024-04-18 DIAGNOSIS — D24.1 BENIGN NEOPLASM OF RIGHT BREAST: ICD-10-CM

## 2024-04-18 PROCEDURE — 19120 REMOVAL OF BREAST LESION: CPT | Mod: RT

## 2024-04-18 PROCEDURE — 88307 TISSUE EXAM BY PATHOLOGIST: CPT | Mod: 26

## 2024-04-18 PROCEDURE — 88307 TISSUE EXAM BY PATHOLOGIST: CPT

## 2024-04-18 RX ORDER — ACETAMINOPHEN 500 MG
2 TABLET ORAL
Qty: 0 | Refills: 0 | DISCHARGE

## 2024-04-18 RX ORDER — OXYCODONE HYDROCHLORIDE 5 MG/1
5 TABLET ORAL ONCE
Refills: 0 | Status: DISCONTINUED | OUTPATIENT
Start: 2024-04-18 | End: 2024-04-18

## 2024-04-18 RX ORDER — HYDROMORPHONE HYDROCHLORIDE 2 MG/ML
0.5 INJECTION INTRAMUSCULAR; INTRAVENOUS; SUBCUTANEOUS ONCE
Refills: 0 | Status: DISCONTINUED | OUTPATIENT
Start: 2024-04-18 | End: 2024-04-18

## 2024-04-18 RX ORDER — SODIUM CHLORIDE 9 MG/ML
1000 INJECTION, SOLUTION INTRAVENOUS
Refills: 0 | Status: DISCONTINUED | OUTPATIENT
Start: 2024-04-18 | End: 2024-04-18

## 2024-04-18 RX ORDER — OXYCODONE HYDROCHLORIDE 5 MG/1
10 TABLET ORAL ONCE
Refills: 0 | Status: DISCONTINUED | OUTPATIENT
Start: 2024-04-18 | End: 2024-04-18

## 2024-04-18 RX ADMIN — SODIUM CHLORIDE 50 MILLILITER(S): 9 INJECTION, SOLUTION INTRAVENOUS at 10:55

## 2024-04-18 NOTE — ASU PREOP CHECKLIST - VIA
[email or web address]
[email or web address]

## 2024-04-18 NOTE — ASU PATIENT PROFILE, ADULT - FALL HARM RISK - UNIVERSAL INTERVENTIONS
Bed in lowest position, wheels locked, appropriate side rails in place/Call bell, personal items and telephone in reach/Instruct patient to call for assistance before getting out of bed or chair/Non-slip footwear when patient is out of bed/[place] to call system/Physically safe environment - no spills, clutter or unnecessary equipment/Purposeful Proactive Rounding/Room/bathroom lighting operational, light cord in reach

## 2024-04-18 NOTE — ASU DISCHARGE PLAN (ADULT/PEDIATRIC) - ASU DC SPECIAL INSTRUCTIONSFT
Susan M. Palleschi, MD, FACS  Specializing in Diseases and Surgery of the Breast  Director, Breast Surgery , [address]  [address]   [place] [address]  Tel: [phone]		Fax: [phone]    Breast Biopsy/Lumpectomy Discharge Instructions    1.	Follow-up Appointment- Please call the office ([phone]) to schedule your post-operative appointment which should be approximately 7-10 days after your surgery.     2.	Bruising/Bleeding/ Swelling – It is normal for there to be some bruising and swelling at the breast biopsy site. Some discomfort at the surgical site is also normal. If your symptoms seem excessive, or if you have any questions or concerns, please call the office.    3.	Supportive Bra- Please bring a sports/athlete bra with a front or back closure with you on the day of surgery. You will wear it home from the hospital. You should wear the supportive bra continuously for 48 hours after your procedure, including wearing it to sleep. Thereafter, you may regular bra. You may remove the bra to shower. The sports bra will provide support, decrease the amount of swelling at the biopsy site and make your recovery more comfortable.    4.	Wound Dressing – The incision(s) will be covered with a special type of surgery glue called Dermabond. It has a purplish hue and looks like plastic. It should stay on the skin until it flakes off naturally over the following 1-2 weeks. Please do NOT peel off the Dermabond. All of the stitches are “internal” and will dissolve naturally.     5.	Ice- You may apply ice to the surgical sites off and on as needed for symptomatic relief.     6.	Showering/Bathing- You may shower over the incision the very next day after surgery. Allow the water to run over the incision, but do NOT scrub the area. It is best not to sit in a bathtub or swimming pool for at least 1 week after surgery.     7.	Activity Level- You may resume most normal daily activity as tolerated, but avoid strenuous activities such as aerobics, jogging, exercising, or heavy lifting for at least 1 week after surgery. You may return to work in 1-2 days after surgery. You may drive as long as you are not taking any prescription pain medication.     8.	Pain Medication- We will send a pain medication prescription to your pharmacy. You may take the prescribed medication, or you may take extra strength Tylenol as needed. Please do NOT take aspirin, Motrin, Advil, or other anti-inflammatory medications, as these medications may cause bleeding and bruising.         Anesthesia Precautions:  For the next 12 hours do not:   •	drive a car,  •	 drink alcohol, beer, or wine,   •	make important personal or business decisions  Diet:   •	Progress diet slowly unless otherwise indicated  Physician Notification  •	Any pharmacy prescription issue  •	Bleeding that does not stop  •	Persistent nausea and vomiting  •	Pain not relieved by medications  •	Fever greater than 101®F  •	Inability to tolerate liquids or foods  •	Unable to urinate after 8 hours  Discharge and Disposition  •	Discharge to home/ group home/assisted living  •	Accompanied by Family/Spouse/ Parents/ Significant Other/ Friend/ and or Caregiver  Follow Up Care:  •	In the event that you develop a complication and you are unable to reach your own physician, you may contact:  [phone] or go to the nearest Emergency Room.   •	Please call your surgeon to schedule your follow up appointment [phone]

## 2024-04-23 ENCOUNTER — NON-APPOINTMENT (OUTPATIENT)
Age: 39
End: 2024-04-23

## 2024-04-24 ENCOUNTER — NON-APPOINTMENT (OUTPATIENT)
Age: 39
End: 2024-04-24

## 2024-04-24 LAB — SURGICAL PATHOLOGY STUDY: SIGNIFICANT CHANGE UP

## 2024-05-02 ENCOUNTER — APPOINTMENT (OUTPATIENT)
Dept: PLASTIC SURGERY | Facility: CLINIC | Age: 39
End: 2024-05-02
Payer: COMMERCIAL

## 2024-05-02 VITALS
DIASTOLIC BLOOD PRESSURE: 73 MMHG | TEMPERATURE: 98 F | OXYGEN SATURATION: 100 % | BODY MASS INDEX: 22.36 KG/M2 | HEIGHT: 64 IN | SYSTOLIC BLOOD PRESSURE: 123 MMHG | WEIGHT: 131 LBS | HEART RATE: 87 BPM

## 2024-05-02 DIAGNOSIS — N61.1 ABSCESS OF THE BREAST AND NIPPLE: ICD-10-CM

## 2024-05-02 PROBLEM — D24.1 BENIGN NEOPLASM OF RIGHT BREAST: Chronic | Status: ACTIVE | Noted: 2024-03-25

## 2024-05-02 PROCEDURE — 99024 POSTOP FOLLOW-UP VISIT: CPT

## 2024-05-02 NOTE — CONSULT LETTER
[Dear  ___] : Dear  [unfilled], [Courtesy Letter:] : I had the pleasure of seeing your patient, [unfilled], in my office today. [Please see my note below.] : Please see my note below. [Sincerely,] : Sincerely, [DrEhsan  ___] : Dr. LUNA [DrEhsan ___] : Dr. LUNA [FreeTextEntry3] : Susan M. Palleschi, MD, FACS\par  Division of Breast Surgery\par  Director, Breast Surgery\par  [facility]\par  [address] [place] [address]\par  [address]\[place] [address]\par  (Phone) [phone]\par  (Fax) [phone]

## 2024-05-02 NOTE — ASSESSMENT
[FreeTextEntry1] : History of right epidermal inclusion cyst with recurrent infection and abscess.; s/p elective excision of the involved skin. Pathology benign  1. Pathology report discussed with patient and copy provided 2. Annual bilateral mammogram/bilateral breast ultrasound at age 42 2. Follow up office visit 10/2024. 4. Advised monthly self breast examinations and advised her to contact me if she has any concerns.

## 2024-05-02 NOTE — PHYSICAL EXAM
[No Supraclavicular Adenopathy] : no supraclavicular adenopathy [No dominant masses] : no dominant masses left breast [No Axillary Lymphadenopathy] : no left axillary lymphadenopathy [No Edema] : no edema [No Rashes] : no rashes [No Ulceration] : no ulceration [Normocephalic] : normocephalic [Atraumatic] : atraumatic [EOMI] : extra ocular movement intact [Sclera nonicteric] : sclera nonicteric [Supple] : supple [Examined in the supine and seated position] : examined in the supine and seated position [No dominant masses] : no dominant masses in right breast  [No Nipple Retraction] : no right nipple retraction [No Nipple Discharge] : no right nipple discharge [de-identified] : right outer periareolar incision C/D/I, no erythema or warmth, no evidence of infection, no palpable seroma/hematoma.

## 2024-05-02 NOTE — HISTORY OF PRESENT ILLNESS
[FreeTextEntry1] : The patient is a 38 year old female here today for a post-op visit.  There is no family history of breast or ovarian cancer.  Her paternal grandmother had colon cancer diagnosed in her late 30s or early 40s. She has a history of a right breast abscess in 2018 12/6/2021 s/p right 3:00 RA core biopsy with wing clip, performed by a breast surgeon, Dr. Yovana Priest. Pathology benign breast tissue with florid sclerosing adenosis and dense nodular stromal fibrosis. 7/18/2022 Bilateral ultrasound: Ultrasound examination of the bilateral breasts was performed utilizing real-time scanning. In the left breast, there are simple cysts measuring up to 0.7 cm. In the retroareolar region of the right breast at the 9 o'clock axis, there is a previously biopsied hypoechoic solid mass measuring 0.4 cm, unchanged from the prior ultrasound examination dated 12/6/2021. The stability of the findings is consistent with a benign etiology. Ultrasound examination of the bilateral axillae reveals no significant sonographic abnormalities. BI-RADS 2 She presented to Urgent care in 2/2023 for the evaluation of right breast pain. She underwent a right breast ultrasound 2/2/2023 Right ultrasound ([employer]): A 0.4 x 0.3 x 0.3 cm previously biopsied and benign circumscribed hypoechoic mass in the 9:00 retroareolar right breast is without significant change from 7/18/2022 and measured 0.6 x 0.4 x 0.6 cm on prebiopsy images of 12/6/2021. No axillary lymphadenopathy. Recommend further management of breast of breast pain as clinically warranted. A bilateral diagnostic mammogram could be considered. BI-RADS 2. History of bilateral nipple piercing with right nipple infection in her 20's Since 2018 she has had 3 episodes of right breast 9:00 infections with warmth and redness lateral breast resolved after spontaneous drainage and antibiotics, incision and drainage not performed. At the end of 1/2023 she noted another inflamed lump closer to the nipple right lateral breast. She showed me a picture of right lateral nipple. She went to the ER and was given antibiotics and it slightly drained pus and then resolved.  5/9/2023- [facility] Testing: negative for breast.  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 4/18/2024 Right breast 9:00, excisional biopsy. Pathology: - Nodular adenosis (4 mm) - Skin, no abnormality seen She is here for her postoperative visit.  She states she is recovering well.

## 2024-05-20 ENCOUNTER — NON-APPOINTMENT (OUTPATIENT)
Age: 39
End: 2024-05-20

## 2024-08-23 ENCOUNTER — HOSPITAL ENCOUNTER (EMERGENCY)
Dept: HOSPITAL 74 - FER | Age: 39
Discharge: HOME | End: 2024-08-23
Payer: COMMERCIAL

## 2024-08-23 VITALS
SYSTOLIC BLOOD PRESSURE: 113 MMHG | HEART RATE: 89 BPM | TEMPERATURE: 98.2 F | DIASTOLIC BLOOD PRESSURE: 76 MMHG | RESPIRATION RATE: 18 BRPM

## 2024-08-23 VITALS — BODY MASS INDEX: 21.9 KG/M2

## 2024-08-23 DIAGNOSIS — R10.30: Primary | ICD-10-CM

## 2024-08-23 DIAGNOSIS — R10.2: ICD-10-CM

## 2024-10-28 ENCOUNTER — APPOINTMENT (OUTPATIENT)
Dept: PLASTIC SURGERY | Facility: CLINIC | Age: 39
End: 2024-10-28
Payer: COMMERCIAL

## 2024-10-28 VITALS
HEIGHT: 64 IN | HEART RATE: 85 BPM | SYSTOLIC BLOOD PRESSURE: 128 MMHG | BODY MASS INDEX: 24.59 KG/M2 | TEMPERATURE: 98.6 F | DIASTOLIC BLOOD PRESSURE: 78 MMHG | OXYGEN SATURATION: 97 % | WEIGHT: 144 LBS

## 2024-10-28 DIAGNOSIS — N61.1 ABSCESS OF THE BREAST AND NIPPLE: ICD-10-CM

## 2024-10-28 DIAGNOSIS — N60.81 OTHER BENIGN MAMMARY DYSPLASIAS OF RIGHT BREAST: ICD-10-CM

## 2024-10-28 DIAGNOSIS — Z80.3 FAMILY HISTORY OF MALIGNANT NEOPLASM OF BREAST: ICD-10-CM

## 2024-10-28 DIAGNOSIS — N64.4 MASTODYNIA: ICD-10-CM

## 2024-10-28 PROCEDURE — 99213 OFFICE O/P EST LOW 20 MIN: CPT

## 2024-10-29 ENCOUNTER — TRANSCRIPTION ENCOUNTER (OUTPATIENT)
Age: 39
End: 2024-10-29

## 2024-11-11 ENCOUNTER — APPOINTMENT (OUTPATIENT)
Dept: PLASTIC SURGERY | Facility: CLINIC | Age: 39
End: 2024-11-11

## 2024-12-26 ENCOUNTER — APPOINTMENT (OUTPATIENT)
Dept: MAMMOGRAPHY | Facility: IMAGING CENTER | Age: 39
End: 2024-12-26

## 2024-12-26 ENCOUNTER — APPOINTMENT (OUTPATIENT)
Dept: ULTRASOUND IMAGING | Facility: IMAGING CENTER | Age: 39
End: 2024-12-26

## 2025-05-22 ENCOUNTER — NON-APPOINTMENT (OUTPATIENT)
Age: 40
End: 2025-05-22

## (undated) DEVICE — DRSG STERISTRIPS 0.5 X 4"

## (undated) DEVICE — GAMMA SLEEVE DISPOSABLE

## (undated) DEVICE — WARMING BLANKET LOWER ADULT

## (undated) DEVICE — PACK MINOR

## (undated) DEVICE — SUT POLYSORB 4-0 18" P-12 UNDYED

## (undated) DEVICE — SPONGE PEANUT REGULAR 3/8"

## (undated) DEVICE — DRAPE TOWEL BLUE 17" X 24"

## (undated) DEVICE — SPECIMEN CONTAINER 100ML

## (undated) DEVICE — VENODYNE/SCD SLEEVE CALF MEDIUM

## (undated) DEVICE — SHEATH SURG GUIDE SCOUT DISP STRL

## (undated) DEVICE — MEDICATION LABELS W MARKER

## (undated) DEVICE — DRAPE INSTRUMENT POUCH 6.75" X 11"

## (undated) DEVICE — SUT SOFSILK 2-0 18" C-23

## (undated) DEVICE — SOL IRR POUR NS 0.9% 500ML

## (undated) DEVICE — PREP BETADINE KIT

## (undated) DEVICE — SOL IRR POUR H2O 250ML

## (undated) DEVICE — DRSG COMBINE 5X9"

## (undated) DEVICE — LAP PAD 18 X 18"

## (undated) DEVICE — POSITIONER PATIENT SAFETY STRAP 3X60"

## (undated) DEVICE — SUT POLYSORB 3-0 30" V-20 UNDYED

## (undated) DEVICE — SUT SURGIPRO II 3-0 18" C-14